# Patient Record
Sex: FEMALE | Race: WHITE | HISPANIC OR LATINO | Employment: PART TIME | URBAN - METROPOLITAN AREA
[De-identification: names, ages, dates, MRNs, and addresses within clinical notes are randomized per-mention and may not be internally consistent; named-entity substitution may affect disease eponyms.]

---

## 2021-03-03 ENCOUNTER — HOSPITAL ENCOUNTER (OUTPATIENT)
Dept: OTHER | Facility: HOSPITAL | Age: 36
Discharge: HOME | End: 2021-03-03

## 2023-08-28 ENCOUNTER — HOSPITAL ENCOUNTER (OUTPATIENT)
Dept: OTHER | Facility: HOSPITAL | Age: 38
Discharge: HOME | End: 2023-08-28

## 2024-07-23 ENCOUNTER — OFFICE VISIT (OUTPATIENT)
Dept: FAMILY MEDICINE CLINIC | Facility: CLINIC | Age: 39
End: 2024-07-23
Payer: OTHER GOVERNMENT

## 2024-07-23 VITALS
WEIGHT: 183.8 LBS | HEIGHT: 67 IN | SYSTOLIC BLOOD PRESSURE: 128 MMHG | DIASTOLIC BLOOD PRESSURE: 72 MMHG | OXYGEN SATURATION: 99 % | BODY MASS INDEX: 28.85 KG/M2 | TEMPERATURE: 98.8 F | HEART RATE: 81 BPM

## 2024-07-23 DIAGNOSIS — Z12.31 ENCOUNTER FOR SCREENING MAMMOGRAM FOR MALIGNANT NEOPLASM OF BREAST: ICD-10-CM

## 2024-07-23 DIAGNOSIS — B35.1 ONYCHOMYCOSIS: ICD-10-CM

## 2024-07-23 DIAGNOSIS — J32.9 FREQUENT EPISODES OF SINUSITIS: ICD-10-CM

## 2024-07-23 DIAGNOSIS — Z86.39 HISTORY OF IRON DEFICIENCY: ICD-10-CM

## 2024-07-23 DIAGNOSIS — Z00.00 ANNUAL PHYSICAL EXAM: ICD-10-CM

## 2024-07-23 DIAGNOSIS — Z86.010 HISTORY OF COLON POLYPS: ICD-10-CM

## 2024-07-23 DIAGNOSIS — D22.30 CHANGE IN FACIAL MOLE: ICD-10-CM

## 2024-07-23 DIAGNOSIS — E55.9 VITAMIN D DEFICIENCY: ICD-10-CM

## 2024-07-23 DIAGNOSIS — Z86.19 HISTORY OF HPV INFECTION: ICD-10-CM

## 2024-07-23 DIAGNOSIS — Z00.00 ENCOUNTER FOR MEDICAL EXAMINATION TO ESTABLISH CARE: Primary | ICD-10-CM

## 2024-07-23 DIAGNOSIS — E66.3 OVERWEIGHT WITH BODY MASS INDEX (BMI) OF 29 TO 29.9 IN ADULT: ICD-10-CM

## 2024-07-23 LAB
ALBUMIN SERPL-MCNC: 4.6 G/DL (ref 3.5–5.2)
ALBUMIN/GLOB SERPL: 1.4 G/DL
ALP SERPL-CCNC: 76 U/L (ref 39–117)
ALT SERPL W P-5'-P-CCNC: 24 U/L (ref 1–33)
ANION GAP SERPL CALCULATED.3IONS-SCNC: 11.3 MMOL/L (ref 5–15)
AST SERPL-CCNC: 23 U/L (ref 1–32)
BASOPHILS # BLD AUTO: 0.04 10*3/MM3 (ref 0–0.2)
BASOPHILS NFR BLD AUTO: 0.7 % (ref 0–1.5)
BILIRUB SERPL-MCNC: 0.2 MG/DL (ref 0–1.2)
BUN SERPL-MCNC: 13 MG/DL (ref 6–20)
BUN/CREAT SERPL: 18.8 (ref 7–25)
CALCIUM SPEC-SCNC: 9.5 MG/DL (ref 8.6–10.5)
CHLORIDE SERPL-SCNC: 101 MMOL/L (ref 98–107)
CHOLEST SERPL-MCNC: 179 MG/DL (ref 0–200)
CO2 SERPL-SCNC: 24.7 MMOL/L (ref 22–29)
CREAT SERPL-MCNC: 0.69 MG/DL (ref 0.57–1)
DEPRECATED RDW RBC AUTO: 37.3 FL (ref 37–54)
EGFRCR SERPLBLD CKD-EPI 2021: 112.7 ML/MIN/1.73
EOSINOPHIL # BLD AUTO: 0.04 10*3/MM3 (ref 0–0.4)
EOSINOPHIL NFR BLD AUTO: 0.7 % (ref 0.3–6.2)
ERYTHROCYTE [DISTWIDTH] IN BLOOD BY AUTOMATED COUNT: 13.1 % (ref 12.3–15.4)
GLOBULIN UR ELPH-MCNC: 3.3 GM/DL
GLUCOSE SERPL-MCNC: 98 MG/DL (ref 65–99)
HCT VFR BLD AUTO: 40.3 % (ref 34–46.6)
HDLC SERPL-MCNC: 61 MG/DL (ref 40–60)
HGB BLD-MCNC: 12.8 G/DL (ref 12–15.9)
IMM GRANULOCYTES # BLD AUTO: 0.02 10*3/MM3 (ref 0–0.05)
IMM GRANULOCYTES NFR BLD AUTO: 0.3 % (ref 0–0.5)
LDLC SERPL CALC-MCNC: 101 MG/DL (ref 0–100)
LDLC/HDLC SERPL: 1.62 {RATIO}
LYMPHOCYTES # BLD AUTO: 2.25 10*3/MM3 (ref 0.7–3.1)
LYMPHOCYTES NFR BLD AUTO: 38.4 % (ref 19.6–45.3)
MCH RBC QN AUTO: 25.4 PG (ref 26.6–33)
MCHC RBC AUTO-ENTMCNC: 31.8 G/DL (ref 31.5–35.7)
MCV RBC AUTO: 80 FL (ref 79–97)
MONOCYTES # BLD AUTO: 0.55 10*3/MM3 (ref 0.1–0.9)
MONOCYTES NFR BLD AUTO: 9.4 % (ref 5–12)
NEUTROPHILS NFR BLD AUTO: 2.96 10*3/MM3 (ref 1.7–7)
NEUTROPHILS NFR BLD AUTO: 50.5 % (ref 42.7–76)
NRBC BLD AUTO-RTO: 0 /100 WBC (ref 0–0.2)
PLATELET # BLD AUTO: 388 10*3/MM3 (ref 140–450)
PMV BLD AUTO: 9.8 FL (ref 6–12)
POTASSIUM SERPL-SCNC: 4.4 MMOL/L (ref 3.5–5.2)
PROT SERPL-MCNC: 7.9 G/DL (ref 6–8.5)
RBC # BLD AUTO: 5.04 10*6/MM3 (ref 3.77–5.28)
SODIUM SERPL-SCNC: 137 MMOL/L (ref 136–145)
TRIGL SERPL-MCNC: 95 MG/DL (ref 0–150)
TSH SERPL DL<=0.05 MIU/L-ACNC: 0.79 UIU/ML (ref 0.27–4.2)
VLDLC SERPL-MCNC: 17 MG/DL (ref 5–40)
WBC NRBC COR # BLD AUTO: 5.86 10*3/MM3 (ref 3.4–10.8)

## 2024-07-23 PROCEDURE — 80061 LIPID PANEL: CPT

## 2024-07-23 PROCEDURE — 80053 COMPREHEN METABOLIC PANEL: CPT

## 2024-07-23 PROCEDURE — 99386 PREV VISIT NEW AGE 40-64: CPT

## 2024-07-23 PROCEDURE — 85025 COMPLETE CBC W/AUTO DIFF WBC: CPT

## 2024-07-23 PROCEDURE — 36415 COLL VENOUS BLD VENIPUNCTURE: CPT

## 2024-07-23 PROCEDURE — 84443 ASSAY THYROID STIM HORMONE: CPT

## 2024-07-23 RX ORDER — IPRATROPIUM BROMIDE 42 UG/1
2 SPRAY, METERED NASAL 4 TIMES DAILY
Qty: 15 ML | Refills: 2 | Status: SHIPPED | OUTPATIENT
Start: 2024-07-23

## 2024-07-23 RX ORDER — CETIRIZINE HYDROCHLORIDE 10 MG/1
10 TABLET ORAL DAILY
COMMUNITY

## 2024-07-23 RX ORDER — CICLOPIROX 80 MG/ML
SOLUTION TOPICAL
COMMUNITY
Start: 2024-06-03

## 2024-07-23 RX ORDER — DIPHENOXYLATE HYDROCHLORIDE AND ATROPINE SULFATE 2.5; .025 MG/1; MG/1
1 TABLET ORAL DAILY
COMMUNITY

## 2024-07-23 RX ORDER — MULTIVIT-MIN/IRON/FOLIC ACID/K 18-600-40
1 CAPSULE ORAL DAILY
COMMUNITY

## 2024-07-23 RX ORDER — CLINDAMYCIN HYDROCHLORIDE 150 MG/1
150 CAPSULE ORAL 4 TIMES DAILY
COMMUNITY
Start: 2024-07-16

## 2024-07-23 NOTE — PROGRESS NOTES
Chief Complaint  Chief Complaint   Patient presents with    [facility]    Annual Exam    Suspicious Skin Lesion     Pt has mole on face that she would like evaluated, left cheek x 3 months       Subjective      Renetta Beverly presents to [facility]  History of Present Illness  The patient presents today to [facility].  She recently moved here from [place].    She has been experiencing sinus issues, which occasionally hinders her ability to drive. Despite wearing glasses and contact lenses, her sinus issues persist. She has been using Flonase and a natural remedy, and has switched to Zyrtec from [facility], which she found beneficial. She has not undergone sinus surgery in the past.  She is interested in seeing an ENT provider to discuss chronic sinusitis.    She has a history of fluctuating iron and vitamin D levels. On several occasions, her vitamin D levels have been low, prompting a temporary regimen of increased dosing of vitamin D, which improved her levels. Currently, she is taking about 7000 IUs of over-the-counter vitamin D daily. She also took iron supplements during her pregnancy and shortly after her pregnancy, but was informed that her iron levels were no longer an issue. However, she experiences dark circles under her eyes even during sleep and wonders if it is related.    She had a colonoscopy in 01/2024, which revealed noncancerous polyps. She was advised to repeat the procedure in 5 years.    She is currently menstruating, with light flow on the first day, heavy flow on the second and third day, and light flow on the fourth day. She is not on any birth control. She has dense breasts and fibrocystic tissue in her breasts, with a couple of large ones on the left side. She has a history of HPV, but on repeat Pap smear HPV head self cleared. She undergoes annual Pap smears, with her last one in 04/2023 in [place].    She has a root canal infection on the  left side and is currently on clindamycin for it.    She has a toenail fungus and has been using nail polish for at least 6 months, which has provided some relief. The middle toe is the most severe.    She had a mole on her face that recently popped, leaving a white discharge from it. It has been present for 3 months. She has other moles that do not look concerning.    Supplemental Information  She has struggled with weight. She has intermittent severe sciatica pain following delivery of her children.   She is not a smoker.   Her sister had a couple of colon polyps. There is no family history of colon cancer. Her mother and grandmother have toenail fungus.      Objective     Medical History:  Past Medical History:   Diagnosis Date    Allergic As a teenager    Manu sinus infections and bouts of hives    Anxiety In my 30s    Colon polyp     My sister had one so i was checked and also had a noncancerous polyp removed    Low back pain     On and off sciatica flare ups after having kids     Past Surgical History:   Procedure Laterality Date     SECTION   &     Both children    COLONOSCOPY      One noncancerous polyp found      Social History     Tobacco Use    Smoking status: Never    Smokeless tobacco: Never   Vaping Use    Vaping status: Never Used   Substance Use Topics    Alcohol use: Not Currently     Comment: I have a drink once every couple of months.    Drug use: Never     Family History   Problem Relation Age of Onset    Anxiety disorder Mother     Cancer Mother         Breast    COPD Mother     Hyperlipidemia Mother     Hyperlipidemia Father     Cancer Paternal Aunt         Breast       Medications:  Prior to Admission medications    Medication Sig Start Date End Date Taking? Authorizing Provider   BLACK COHOSH PO Take 1 capsule by mouth Daily.   Yes Kera Sepulveda MD   cetirizine (zyrTEC) 10 MG tablet Take 1 tablet by mouth Daily.   Yes Kera Sepulveda MD   ciclopirox  "(PENLAC) 8 % solution  6/3/24  Yes Kera Speulveda MD   clindamycin (CLEOCIN) 150 MG capsule Take 1 capsule by mouth 4 (Four) Times a Day. 7/16/24  Yes Kera Sepulveda MD   multivitamin (THERAGRAN) tablet tablet Take 1 tablet by mouth Daily.   Yes Kera Sepulveda MD   Probiotic Product (PROBIOTIC DAILY PO) Take 1 capsule by mouth Daily.   Yes Kera Sepulveda MD   Vitamin D, Cholecalciferol, 50 MCG (2000 UT) capsule Take 1 capsule by mouth Daily.   Yes Kera Sepulveda MD   vitamin D3 125 MCG (5000 UT) capsule capsule Take 1 capsule by mouth Daily.   Yes Kera Sepulveda MD        Allergies:   Latex, Penicillins, and Shellfish-derived products    Health Maintenance Due   Topic Date Due    COVID-19 Vaccine (1 - 2023-24 season) Never done    HEPATITIS C SCREENING  Never done    ANNUAL PHYSICAL  Never done    PAP SMEAR  Never done         Vital Signs:   /72 (BP Location: Left arm, Patient Position: Sitting, Cuff Size: Adult)   Pulse 81   Temp 98.8 °F (37.1 °C) (Oral)   Ht 168.9 cm (66.5\")   Wt 83.4 kg (183 lb 12.8 oz)   SpO2 99%   BMI 29.22 kg/m²     Wt Readings from Last 3 Encounters:   07/23/24 83.4 kg (183 lb 12.8 oz)     BP Readings from Last 3 Encounters:   07/23/24 128/72     Physical Exam  Vitals reviewed.   Constitutional:       Appearance: Normal appearance. She is well-developed.   HENT:      Head: Normocephalic and atraumatic.      Right Ear: No tenderness. A middle ear effusion is present.      Left Ear: No tenderness. A middle ear effusion is present.      Nose: Congestion present.      Right Sinus: Maxillary sinus tenderness present.      Left Sinus: Maxillary sinus tenderness present.   Eyes:      Conjunctiva/sclera: Conjunctivae normal.      Pupils: Pupils are equal, round, and reactive to light.   Cardiovascular:      Rate and Rhythm: Normal rate and regular rhythm.      Heart sounds: No murmur heard.     No friction rub. No gallop.   Pulmonary:      Effort: " Pulmonary effort is normal.      Breath sounds: Normal breath sounds. No wheezing or rhonchi.   Abdominal:      General: Bowel sounds are normal. There is no distension.      Palpations: Abdomen is soft.      Tenderness: There is no abdominal tenderness.   Skin:     General: Skin is warm and dry.   Neurological:      Mental Status: She is alert and oriented to person, place, and time.      Cranial Nerves: No cranial nerve deficit.   Psychiatric:         Mood and Affect: Mood and affect normal.         Behavior: Behavior normal.         Thought Content: Thought content normal.         Judgment: Judgment normal.       Physical Exam        Result Review :    The following data was reviewed by CAT Sewell on 07/23/24 at 13:02 EDT:        No Images in the past 120 days found..    Results                 Assessment and Plan    Diagnoses and all orders for this visit:    1. Encounter for medical examination to establish care (Primary)    2. Annual physical exam  -     CBC & Differential  -     Comprehensive Metabolic Panel  -     Lipid Panel  -     TSH Rfx On Abnormal To Free T4    3. History of HPV infection    4. History of iron deficiency    5. Vitamin D deficiency    6. Change in facial mole    7. Overweight with body mass index (BMI) of 29 to 29.9 in adult    8. History of colon polyps    9. Encounter for screening mammogram for malignant neoplasm of breast  -     Cancel: Mammo Screening Digital Tomosynthesis Bilateral With CAD; Future  -     Cancel: Mammo Screening Digital Tomosynthesis Bilateral With CAD; Future  -     Mammo Screening Digital Tomosynthesis Bilateral With CAD; Future    10. Frequent episodes of sinusitis  -     Ambulatory Referral to ENT (Otolaryngology)  -     ipratropium (ATROVENT) 0.06 % nasal spray; 2 sprays into the nostril(s) as directed by provider 4 (Four) Times a Day.  Dispense: 15 mL; Refill: 2    11. Onychomycosis       Assessment & Plan  1. Health maintenance.  She is due  for a mammogram next month. Blood work will be conducted to rule out anemia, evaluate iron levels and vitamin D levels. A mammogram will be ordered.  Patient will follow-up for repeat Pap next April.    2.  Chronic sinusitis.  [facility] to [facility] will be made. Atrovent nasal spray will be prescribed.  Continue use of Flonase, Zyrtec.    3. Noncancerous colon polyps.  A colonoscopy is recommended in 5 years.    4. Menorrhagia.  This could be contributing to her low iron levels.    5. Onychomycosis.  Oral medications were discussed but patient declines at this time as these medications can be hard on the liver.  She will continue topical antifungal treatment.    6. Facial mole.  She was advised to clean the mole with a gentle facial  and keep it moisturized. The mole will be monitored for a week. Should the mole not heal within a couple of weeks, a referral to Dermatology will be made.          Smoking Cessation:    [facility]  reports that she has never smoked. She has never used smokeless tobacco.       Follow Up   Return in about 6 months (around 1/23/2025) for Next scheduled follow up.  Patient was given instructions and counseling regarding her condition or for health maintenance advice. Please see specific information pulled into the AVS if appropriate.     Please note that portions of this note were completed with a voice recognition program.    Patient or patient representative verbalized consent for the use of Ambient Listening during the visit with  CAT Sewell for chart documentation. 7/23/2024  12:59 EDT

## 2024-08-01 ENCOUNTER — PATIENT ROUNDING (BHMG ONLY) (OUTPATIENT)
Dept: FAMILY MEDICINE CLINIC | Facility: CLINIC | Age: 39
End: 2024-08-01
Payer: OTHER GOVERNMENT

## 2024-08-01 NOTE — PROGRESS NOTES
August 1, 2024    Hello, may I speak with Renetta Beverly?    My name is Vish Jimenez       I am  with [facility]  [address]  [address]  [facility] [address]  [phone].    Before we get started may I verify your date of birth? 1985    I am calling to officially welcome you to our practice and ask about your recent visit. Is this a good time to talk? yes    Tell me about your visit with us. What things went well?  it was a great exp, everyone was so nice Jackie listen to my need and the staff was wonderful       We're always looking for ways to make our patients' experiences even better. Do you have recommendations on ways we may improve?  no    Overall were you satisfied with your first visit to our practice? yes       I appreciate you taking the time to speak with me today. Is there anything else I can do for you? no      Thank you, and have a great day.

## 2024-09-20 ENCOUNTER — HOSPITAL ENCOUNTER (OUTPATIENT)
Dept: MAMMOGRAPHY | Facility: HOSPITAL | Age: 39
Discharge: HOME | End: 2024-09-20
Payer: OTHER GOVERNMENT

## 2024-09-20 DIAGNOSIS — Z12.31 ENCOUNTER FOR SCREENING MAMMOGRAM FOR MALIGNANT NEOPLASM OF BREAST: ICD-10-CM

## 2024-09-20 PROCEDURE — 77067 SCR MAMMO BI INCL CAD: CPT

## 2024-09-20 PROCEDURE — 77063 BREAST TOMOSYNTHESIS BI: CPT

## 2024-10-01 DIAGNOSIS — Z12.31 ENCOUNTER FOR SCREENING MAMMOGRAM FOR MALIGNANT NEOPLASM OF BREAST: Primary | ICD-10-CM

## 2024-10-17 ENCOUNTER — TELEPHONE (OUTPATIENT)
Dept: FAMILY MEDICINE CLINIC | Facility: CLINIC | Age: 39
End: 2024-10-17

## 2024-10-17 ENCOUNTER — CLINICAL SUPPORT (OUTPATIENT)
Dept: FAMILY MEDICINE CLINIC | Facility: CLINIC | Age: 39
End: 2024-10-17
Payer: OTHER GOVERNMENT

## 2024-10-17 DIAGNOSIS — Z23 NEED FOR INFLUENZA VACCINATION: Primary | ICD-10-CM

## 2024-10-17 DIAGNOSIS — Z12.31 ENCOUNTER FOR SCREENING MAMMOGRAM FOR MALIGNANT NEOPLASM OF BREAST: Primary | ICD-10-CM

## 2024-10-17 PROCEDURE — 90471 IMMUNIZATION ADMIN: CPT

## 2024-10-17 PROCEDURE — 90656 IIV3 VACC NO PRSV 0.5 ML IM: CPT

## 2024-10-17 NOTE — TELEPHONE ENCOUNTER
I looked in patient's chart, she completed a mammogram on 09/20/2024 and was unsure why another order was placed for one. Please advise.

## 2024-10-17 NOTE — TELEPHONE ENCOUNTER
Caller: Renetta Beverly    Relationship: Self    Best call back number: [phone]     What is the best time to reach you: ANYTIME     Who are you requesting to speak with (clinical staff, provider,  specific staff member): CLINICAL      What was the call regarding: PATIENT IS CALLING TO CLARIFY A RECENT ORDER, PLACED THAT THE PATIENT WAS ABLE TO VIEW VIA MY CHART, STATED SHE HAS ALREADY COMPLETED MAMMOGRAM, POSSIBLE DUPLICATE ORDER.

## 2024-12-20 ENCOUNTER — OFFICE VISIT (OUTPATIENT)
Dept: FAMILY MEDICINE CLINIC | Facility: CLINIC | Age: 39
End: 2024-12-20
Payer: OTHER GOVERNMENT

## 2024-12-20 VITALS
BODY MASS INDEX: 29.98 KG/M2 | TEMPERATURE: 98.4 F | OXYGEN SATURATION: 100 % | HEART RATE: 95 BPM | SYSTOLIC BLOOD PRESSURE: 114 MMHG | DIASTOLIC BLOOD PRESSURE: 78 MMHG | WEIGHT: 191 LBS | HEIGHT: 67 IN

## 2024-12-20 DIAGNOSIS — R09.81 SINUS CONGESTION: ICD-10-CM

## 2024-12-20 DIAGNOSIS — J34.2 DEVIATED SEPTUM: ICD-10-CM

## 2024-12-20 DIAGNOSIS — J32.4 CHRONIC PANSINUSITIS: Primary | ICD-10-CM

## 2024-12-20 DIAGNOSIS — J32.4 CHRONIC PANSINUSITIS: ICD-10-CM

## 2024-12-20 PROCEDURE — 99214 OFFICE O/P EST MOD 30 MIN: CPT

## 2024-12-20 RX ORDER — OXYMETAZOLINE HYDROCHLORIDE 0.05 G/100ML
2 SPRAY NASAL 2 TIMES DAILY
Qty: 15 ML | Refills: 0 | Status: SHIPPED | OUTPATIENT
Start: 2024-12-20

## 2024-12-20 RX ORDER — PREDNISONE 20 MG/1
TABLET ORAL
COMMUNITY
Start: 2024-11-13

## 2024-12-20 RX ORDER — GUAIFENESIN 600 MG/1
1200 TABLET, EXTENDED RELEASE ORAL 2 TIMES DAILY
Qty: 40 TABLET | Refills: 0 | Status: SHIPPED | OUTPATIENT
Start: 2024-12-20 | End: 2024-12-30

## 2024-12-20 RX ORDER — AZITHROMYCIN 250 MG/1
TABLET, FILM COATED ORAL
Qty: 6 TABLET | Refills: 0 | Status: SHIPPED | OUTPATIENT
Start: 2024-12-20

## 2024-12-20 RX ORDER — ALBUTEROL SULFATE 90 UG/1
INHALANT RESPIRATORY (INHALATION)
COMMUNITY
Start: 2024-11-13

## 2024-12-20 RX ORDER — AZITHROMYCIN 250 MG/1
TABLET, FILM COATED ORAL
Qty: 6 TABLET | Refills: 0 | Status: SHIPPED | OUTPATIENT
Start: 2024-12-20 | End: 2024-12-20 | Stop reason: SDUPTHER

## 2024-12-20 NOTE — PROGRESS NOTES
Chief Complaint  Chief Complaint   Patient presents with    Vitamin D Deficiency    Sinus Problem     Pressure in face and ears. She has been suing saline and sudafed for 7 days. Pt has deviated septum.       Subjective      Renetta Beverly presents to [facility]  History of Present Illness  The patient is a 39-year-old female who presents today for a 6-month follow-up.    She has a history of chronic sinusitis and was previously referred [facility]. She was advised to continue using Zyrtec and Flonase. Today, she reports symptoms of a possible sinus infection, including facial and ear pressure. She has been using Sudafed for the past 7 days and nasal saline rinses. She occasionally uses Flonase and has been using saline rinses. Despite using saline spray and taking warm showers in the morning, she has not seen any improvement. She reports no fever. Her last sinus infection was at the start of the year. She uses a natural nose gel and breathing strips. She avoids antibiotic treatment but was treated with azithromycin earlier in the year. She reports pressure under her eyes, ears, and behind her eyes. She has not been using her contact lenses due to pressure on top of her eyes, especially when driving at night.    She has been diagnosed with a deviated septum by an ENT specialist and is scheduled for allergy testing. She experienced dryness in her nose for 2 weeks, which led to blood discharge.  After working remotely, she experiences pressure in her heart and head, accompanied by dizziness. She consumes a significant amount of coffee and has been experiencing increased leg cramps.    She had to have antibiotics for an abscess in her tooth in 06/2023.    FAMILY HISTORY  She has a family history of heart issues.    ALLERGIES  She is allergic to PENICILLIN.      Objective     Medical History:  Past Medical History:   Diagnosis Date    Allergic As a teenager    Manu sinus  infections and bouts of hives    Anxiety In my 30s    Colon polyp     My sister had one so i was checked and also had a noncancerous polyp removed    Low back pain 2019    On and off sciatica flare ups after having kids     Past Surgical History:   Procedure Laterality Date     SECTION   &     Both children    COLONOSCOPY      One noncancerous polyp found      Social History     Tobacco Use    Smoking status: Never    Smokeless tobacco: Never   Vaping Use    Vaping status: Never Used   Substance Use Topics    Alcohol use: Not Currently     Comment: I have a drink once every couple of months.    Drug use: Never     Family History   Problem Relation Age of Onset    Anxiety disorder Mother     Cancer Mother         Breast    COPD Mother     Hyperlipidemia Mother     Hyperlipidemia Father     Cancer Paternal Aunt         Breast       Medications:  Prior to Admission medications    Medication Sig Start Date End Date Taking? Authorizing Provider   albuterol sulfate  (90 Base) MCG/ACT inhaler 2 PUFFS EVERY 4 HOURS AS NEEDED FOR COUGH, WHEEZE OR SHORTNESS OF BREATH. USE WITH VORTEX SPACER. 24  Yes Kera Sepulveda MD   BLACK COHOSH PO Take 1 capsule by mouth Daily.   Yes Kera Sepulveda MD   cetirizine (zyrTEC) 10 MG tablet Take 1 tablet by mouth Daily.   Yes Kera Sepulveda MD   ciclopirox (PENLAC) 8 % solution  6/3/24  Yes Kera Sepulveda MD   ipratropium (ATROVENT) 0.06 % nasal spray 2 sprays into the nostril(s) as directed by provider 4 (Four) Times a Day. 24  Yes Jackie Mckeon APRN   multivitamin (THERAGRAN) tablet tablet Take 1 tablet by mouth Daily.   Yes Kera Sepulveda MD   predniSONE (DELTASONE) 20 MG tablet TAKE 1 TABLET TWICE DAILY FOR 5 DAYS.- START 5 DAYS BEFORE TESTING 24  Yes Kera Sepulveda MD   Probiotic Product (PROBIOTIC DAILY PO) Take 1 capsule by mouth Daily.   Yes Kera Sepulveda MD   Vitamin D,  "Cholecalciferol, 50 MCG (2000 UT) capsule Take 1 capsule by mouth Daily.   Yes Kera Sepulveda MD   vitamin D3 125 MCG (5000 UT) capsule capsule Take 1 capsule by mouth Daily.   Yes Kera Sepulveda MD   clindamycin (CLEOCIN) 150 MG capsule Take 1 capsule by mouth 4 (Four) Times a Day. 7/16/24   Kera Sepulveda MD        Allergies:   Latex, Penicillins, and Shellfish-derived products    Health Maintenance Due   Topic Date Due    HEPATITIS C SCREENING  Never done    PAP SMEAR  Never done    COVID-19 Vaccine (1 - 2024-25 season) Never done         Vital Signs:   /78 (BP Location: Left arm, Patient Position: Sitting, Cuff Size: Adult)   Pulse 95   Temp 98.4 °F (36.9 °C) (Oral)   Ht 168.9 cm (66.5\")   Wt 86.6 kg (191 lb)   SpO2 100%   BMI 30.37 kg/m²     Wt Readings from Last 3 Encounters:   12/20/24 86.6 kg (191 lb)   07/23/24 83.4 kg (183 lb 12.8 oz)     BP Readings from Last 3 Encounters:   12/20/24 114/78   07/23/24 128/72                Physical Exam  Vitals reviewed.   Constitutional:       Appearance: Normal appearance. She is well-developed.   HENT:      Head: Normocephalic and atraumatic.      Right Ear: A middle ear effusion is present. Tympanic membrane is not erythematous.      Left Ear: No tenderness.  No middle ear effusion. Tympanic membrane is not erythematous.      Nose:      Right Sinus: Maxillary sinus tenderness and frontal sinus tenderness present.      Left Sinus: Maxillary sinus tenderness and frontal sinus tenderness present.   Eyes:      Conjunctiva/sclera: Conjunctivae normal.      Pupils: Pupils are equal, round, and reactive to light.   Cardiovascular:      Rate and Rhythm: Normal rate and regular rhythm.      Heart sounds: No murmur heard.     No friction rub. No gallop.   Pulmonary:      Effort: Pulmonary effort is normal.      Breath sounds: Normal breath sounds. No wheezing or rhonchi.   Abdominal:      General: Bowel sounds are normal. There is no distension. "      Palpations: Abdomen is soft.      Tenderness: There is no abdominal tenderness.   Skin:     General: Skin is warm and dry.   Neurological:      Mental Status: She is alert and oriented to person, place, and time.      Cranial Nerves: No cranial nerve deficit.   Psychiatric:         Mood and Affect: Mood and affect normal.         Behavior: Behavior normal.         Thought Content: Thought content normal.         Judgment: Judgment normal.       Physical Exam  Fluid is present in the right ear than the left ear.  Lungs are normal.  Heart is normal.      Result Review :    The following data was reviewed by CAT Sewell on 12/20/24 at 08:14 EST:        Mammo Screening Digital Tomosynthesis Bilateral With CAD    Result Date: 9/29/2024  No mammographic signs of malignancy. Recommend annual screening mammography  TISSUE DENSITY: The breasts are heterogeneously dense, which may obscure small masses. The patient will receive a letter regarding breast density.  BI-RADS ASSESSMENT: BI-RADS 1. Negative.   Note: It has been reported that there is approximately a 15% false negative in mammography. Therefore, management of a palpable abnormality should not be deferred because of a negative mammogram.           Electronically Signed By-BETSY GEORGE MD On:9/29/2024 10:59 AM        Results                 Assessment and Plan    Diagnoses and all orders for this visit:    1. Chronic pansinusitis (Primary)  -     guaiFENesin (Mucinex) 600 MG 12 hr tablet; Take 2 tablets by mouth 2 (Two) Times a Day for 10 days.  Dispense: 40 tablet; Refill: 0  -     oxymetazoline (Afrin Nasal Spray) 0.05 % nasal spray; Administer 2 sprays into the nostril(s) as directed by provider 2 (Two) Times a Day.  Dispense: 15 mL; Refill: 0  -     azithromycin (Zithromax Z-Ezio) 250 MG tablet; Take 2 tablets by mouth on day 1, then 1 tablet daily on days 2-5  Dispense: 6 tablet; Refill: 0    2. Deviated septum  -     guaiFENesin (Mucinex)  600 MG 12 hr tablet; Take 2 tablets by mouth 2 (Two) Times a Day for 10 days.  Dispense: 40 tablet; Refill: 0  -     oxymetazoline (Afrin Nasal Spray) 0.05 % nasal spray; Administer 2 sprays into the nostril(s) as directed by provider 2 (Two) Times a Day.  Dispense: 15 mL; Refill: 0    3. Sinus congestion  -     guaiFENesin (Mucinex) 600 MG 12 hr tablet; Take 2 tablets by mouth 2 (Two) Times a Day for 10 days.  Dispense: 40 tablet; Refill: 0  -     oxymetazoline (Afrin Nasal Spray) 0.05 % nasal spray; Administer 2 sprays into the nostril(s) as directed by provider 2 (Two) Times a Day.  Dispense: 15 mL; Refill: 0  -     azithromycin (Zithromax Z-Ezio) 250 MG tablet; Take 2 tablets by mouth on day 1, then 1 tablet daily on days 2-5  Dispense: 6 tablet; Refill: 0       Assessment & Plan  1. Sinus infection.  Symptoms are indicative of a sinus infection, with significant pressure in the face and ears, and thick mucus. She was advised to discontinue Sudafed. Continuation of steam rinse, saline rinse, and neti pot was advised. Afrin was suggested for 5 to 7 days. Mucinex was prescribed to thin secretions. Azithromycin was prescribed and will be sent to [facility] pharmacy.    2. Dizziness.  The dizziness could be due to sinus pressure or perimenopause. She was advised to replace coffee with water and increase her intake of electrolytes, such as Liquid IV. If dizziness persists, a Holter monitor may be considered to capture any cardiac events.            Smoking Cessation:    Renetta Beverly  reports that she has never smoked. She has never used smokeless tobacco.             Follow Up   Return in about 6 months (around 6/20/2025), or if symptoms worsen or fail to improve, for Next scheduled follow up, Annual physical.  Patient was given instructions and counseling regarding her condition or for health maintenance advice. Please see specific information pulled into the AVS if appropriate.     Please note that portions of  this note were completed with a voice recognition program.    Patient or patient representative verbalized consent for the use of Ambient Listening during the visit with  CAT Sewell for chart documentation. 12/20/2024  08:14 EST

## 2025-04-23 ENCOUNTER — OFFICE VISIT (OUTPATIENT)
Dept: FAMILY MEDICINE CLINIC | Facility: CLINIC | Age: 40
End: 2025-04-23
Payer: OTHER GOVERNMENT

## 2025-04-23 VITALS
SYSTOLIC BLOOD PRESSURE: 104 MMHG | HEIGHT: 67 IN | OXYGEN SATURATION: 98 % | WEIGHT: 196.1 LBS | DIASTOLIC BLOOD PRESSURE: 70 MMHG | TEMPERATURE: 98.3 F | BODY MASS INDEX: 30.78 KG/M2 | HEART RATE: 105 BPM

## 2025-04-23 DIAGNOSIS — B35.1 ONYCHOMYCOSIS: ICD-10-CM

## 2025-04-23 DIAGNOSIS — M54.42 CHRONIC MIDLINE LOW BACK PAIN WITH LEFT-SIDED SCIATICA: ICD-10-CM

## 2025-04-23 DIAGNOSIS — N89.8 VAGINAL DISCHARGE: ICD-10-CM

## 2025-04-23 DIAGNOSIS — G89.29 CHRONIC RLQ PAIN: ICD-10-CM

## 2025-04-23 DIAGNOSIS — G89.29 CHRONIC MIDLINE LOW BACK PAIN WITH LEFT-SIDED SCIATICA: ICD-10-CM

## 2025-04-23 DIAGNOSIS — Z01.419 WELL WOMAN EXAM WITH ROUTINE GYNECOLOGICAL EXAM: Primary | ICD-10-CM

## 2025-04-23 DIAGNOSIS — R10.31 CHRONIC RLQ PAIN: ICD-10-CM

## 2025-04-23 LAB
CANDIDA SPECIES: NEGATIVE
GARDNERELLA VAGINALIS: NEGATIVE
T VAGINALIS DNA VAG QL PROBE+SIG AMP: NEGATIVE

## 2025-04-23 PROCEDURE — G0123 SCREEN CERV/VAG THIN LAYER: HCPCS

## 2025-04-23 PROCEDURE — 87480 CANDIDA DNA DIR PROBE: CPT

## 2025-04-23 PROCEDURE — 87660 TRICHOMONAS VAGIN DIR PROBE: CPT

## 2025-04-23 PROCEDURE — 87510 GARDNER VAG DNA DIR PROBE: CPT

## 2025-04-23 PROCEDURE — 87624 HPV HI-RISK TYP POOLED RSLT: CPT

## 2025-04-23 RX ORDER — CICLOPIROX 80 MG/ML
SOLUTION TOPICAL NIGHTLY
Qty: 6 ML | Refills: 0 | Status: SHIPPED | OUTPATIENT
Start: 2025-04-23

## 2025-04-23 NOTE — PROGRESS NOTES
"  ENCOUNTER DATE:  04/23/2025    Renetta Zayas-Pennington / 39 y.o. / female      CHIEF COMPLAINT     Gynecologic Exam      VITALS     Visit Vitals  /70 (BP Location: Left arm, Patient Position: Sitting, Cuff Size: Adult)   Pulse 105   Temp 98.3 °F (36.8 °C) (Oral)   Ht 168.9 cm (66.5\")   Wt 89 kg (196 lb 1.6 oz)   LMP 04/04/2025 (Exact Date)   SpO2 98%   Breastfeeding No   BMI 31.18 kg/m²       BP Readings from Last 3 Encounters:   04/23/25 104/70   12/20/24 114/78   07/23/24 128/72     Wt Readings from Last 3 Encounters:   04/23/25 89 kg (196 lb 1.6 oz)   12/20/24 86.6 kg (191 lb)   07/23/24 83.4 kg (183 lb 12.8 oz)      Body mass index is 31.18 kg/m².    MEDICATIONS     Current Outpatient Medications on File Prior to Visit   Medication Sig Dispense Refill    albuterol sulfate  (90 Base) MCG/ACT inhaler 2 PUFFS EVERY 4 HOURS AS NEEDED FOR COUGH, WHEEZE OR SHORTNESS OF BREATH. USE WITH VORTEX SPACER.      BLACK COHOSH PO Take 1 capsule by mouth Daily.      cetirizine (zyrTEC) 10 MG tablet Take 1 tablet by mouth Daily.      ipratropium (ATROVENT) 0.06 % nasal spray 2 sprays into the nostril(s) as directed by provider 4 (Four) Times a Day. 15 mL 2    multivitamin (THERAGRAN) tablet tablet Take 1 tablet by mouth Daily.      oxymetazoline (Afrin Nasal Spray) 0.05 % nasal spray Administer 2 sprays into the nostril(s) as directed by provider 2 (Two) Times a Day. 15 mL 0    Probiotic Product (PROBIOTIC DAILY PO) Take 1 capsule by mouth Daily.      Vitamin D, Cholecalciferol, 50 MCG (2000 UT) capsule Take 1 capsule by mouth Daily.      vitamin D3 125 MCG (5000 UT) capsule capsule Take 1 capsule by mouth Daily.      [DISCONTINUED] ciclopirox (PENLAC) 8 % solution       predniSONE (DELTASONE) 20 MG tablet TAKE 1 TABLET TWICE DAILY FOR 5 DAYS.- START 5 DAYS BEFORE TESTING      [DISCONTINUED] azithromycin (Zithromax Z-Ezio) 250 MG tablet Take 2 tablets by mouth on day 1, then 1 tablet daily on days 2-5 6 tablet 0 " "    No current facility-administered medications on file prior to visit.         HISTORY OF PRESENT ILLNESS     Renetta presents for annual health maintenance visit.    Obstetric History:  OB History    No obstetric history on file.        Menstrual History:     Patient's last menstrual period was 2025 (exact date).         No results found for: \"HPVAPTIMA\"    History of Present Illness  The patient presents for a well-woman exam with Pap smear, onychomycosis, and sciatica.    She has a history of an abnormal Pap smear a few years ago, followed by a normal one. Her last Pap smear was conducted in [place]. No current concerns or issues are reported. Intermittent pain, described as sharp, is attributed to scar tissue from a  performed many years ago. This pain is occasionally associated with her menstrual cycle. Initially, the pain was triggered by bowel movements post-, but this symptom has since resolved. She does not experience pain during intercourse or any vaginal discharge or itching. Occasional brown discharge following her menstrual period is noted. No concerns regarding sexually transmitted diseases are reported, and she does not wish to undergo any screenings or testing today. Her last menstrual period started on 2025, and her cycles remain regular. A mammogram was conducted in 2024, and no new lumps or bumps in her breasts are reported, noting that they are consistent with her known fibrocystic changes.    A refill of Penlac solution for her toenails is requested, which she admits to not having cared for as she should have. Improvement with the use of Penlac is observed, and she prefers not to take oral antifungal medication.    Plans to seek physical therapy for sciatica are mentioned, which has been present since before the birth of her daughter, who is now five years old. Radiating pain down the left side after two days of exercise is experienced, leading to periods of " bed rest. No x-ray of her back has been done in [place]. She was previously referred to a physical therapist in [place], where exercises and steroid injections in her back were administered.    GYNECOLOGICAL HISTORY:  - Last Menstrual Period: 2025    PAST SURGICAL HISTORY:  - : Date   - Steroid injections for back pain: Date     FAMILY HISTORY  Her mother and grandmother had the same fungal disease of the toenails.        Last health maintenance visit: approximately 1 year ago  General health: good  Lifestyle:  Attempting to lose weight?: Yes   Diet: eats moderately healthy  Exercise: generally stays active (work/exercise)  Tobacco: Never used   Alcohol: does not drink  Work: Full-time  Reproductive health:  Sexually active?: Yes   Sexual problems?: No problems  Concern for STD?: No    Sees Gynecologist?: No   Flores/Postmenopausal?: No   Domestic abuse concerns: No   - Depression Screenin/23/2025     8:23 AM   PHQ-2/PHQ-9 Depression Screening   Little interest or pleasure in doing things Not at all   Feeling down, depressed, or hopeless Not at all   How difficult have these problems made it for you to do your work, take care of things at home, or get along with other people? Not difficult at all         PHQ-2: 0 (Not depressed)   PHQ-9: 0 (Negative screening for depression)    Patient Care Team:  Jackie Mckeon APRN as PCP - General (Emergency Medicine)      ALLERGIES  Allergies   Allergen Reactions    Latex Hives    Penicillins Hives    Shellfish-Derived Products Hives        PFSH:     The following portions of the patient's history were reviewed and updated as appropriate: Allergies / Current Medications / Past Medical History / Surgical History / Social History / Family History    PROBLEM LIST   There is no problem list on file for this patient.      PAST MEDICAL HISTORY  Past Medical History:   Diagnosis Date    Allergic As a teenager    Manu sinus infections and bouts  of hives    Anxiety In my 30s    Colon polyp     My sister had one so i was checked and also had a noncancerous polyp removed    Low back pain     On and off sciatica flare ups after having kids       SURGICAL HISTORY  Past Surgical History:   Procedure Laterality Date     SECTION   &     Both children    COLONOSCOPY      One noncancerous polyp found       SOCIAL HISTORY  Social History     Socioeconomic History    Marital status:    Tobacco Use    Smoking status: Never    Smokeless tobacco: Never   Vaping Use    Vaping status: Never Used   Substance and Sexual Activity    Alcohol use: Not Currently     Comment: I have a drink once every couple of months.    Drug use: Never    Sexual activity: Yes     Partners: Male     Birth control/protection: Condom       FAMILY HISTORY  Family History   Problem Relation Age of Onset    Anxiety disorder Mother     Cancer Mother         Breast    COPD Mother     Hyperlipidemia Mother     Hyperlipidemia Father     Cancer Paternal Aunt         Breast       IMMUNIZATION HISTORY  Immunization History   Administered Date(s) Administered    Flublok 18+yrs 2021    Fluzone  >6mos 10/17/2024    Fluzone (or Fluarix & Flulaval for VFC) >6mos 2018, 2018, 10/04/2019, 2020, 2023    Tdap 2018, 10/04/2019    Varicella 2022         PHYSICAL EXAMINATION     Physical Exam  Vitals reviewed. Exam conducted with a chaperone present.   Constitutional:       General: She is not in acute distress.     Appearance: Normal appearance. She is well-developed. She is not diaphoretic.   HENT:      Head: Normocephalic and atraumatic. Hair is normal.      Right Ear: Hearing normal. No decreased hearing noted. No drainage.      Left Ear: Hearing and tympanic membrane normal. No decreased hearing noted.      Nose: Nose normal. No nasal deformity.      Mouth/Throat:      Mouth: Mucous membranes are moist.   Eyes:      General: Lids are  normal.      Extraocular Movements: Extraocular movements intact.      Conjunctiva/sclera: Conjunctivae normal.      Pupils: Pupils are equal, round, and reactive to light.   Neck:      Thyroid: No thyromegaly.      Vascular: No JVD.   Cardiovascular:      Rate and Rhythm: Normal rate and regular rhythm.      Pulses: Normal pulses.      Heart sounds: Normal heart sounds. No murmur heard.     No friction rub. No gallop.   Pulmonary:      Effort: Pulmonary effort is normal. No respiratory distress.      Breath sounds: Normal breath sounds. No wheezing.   Chest:   Breasts:     Breasts are symmetrical.      Right: Normal. No mass, nipple discharge, skin change or tenderness.      Left: Normal. No mass, nipple discharge, skin change or tenderness.   Abdominal:      General: Bowel sounds are normal. There is no distension.      Palpations: Abdomen is soft.      Tenderness: There is no abdominal tenderness in the right lower quadrant.   Genitourinary:     General: Normal vulva.      Pubic Area: No rash.       Labia:         Right: No rash or lesion.         Left: No rash or lesion.       Urethra: No urethral swelling.      Vagina: Normal. Vaginal discharge present. No lesions.      Cervix: Erythema present. No discharge or cervical bleeding.      Uterus: Normal. Not tender.       Adnexa: Right adnexa normal and left adnexa normal.        Right: No tenderness.          Left: No tenderness.        Rectum: Normal.            Comments: Erythematous area to right lower portion of the cervix; no bleeding noted  Musculoskeletal:         General: No tenderness or deformity. Normal range of motion.      Cervical back: Normal range of motion and neck supple.   Skin:     General: Skin is warm and dry.      Findings: No erythema or rash.   Neurological:      Mental Status: She is alert and oriented to person, place, and time.      Cranial Nerves: No cranial nerve deficit.      Motor: No abnormal muscle tone.      Coordination:  "Coordination normal.      Deep Tendon Reflexes: Reflexes are normal and symmetric.   Psychiatric:         Mood and Affect: Mood normal.         Behavior: Behavior normal.         Thought Content: Thought content normal.         Judgment: Judgment normal.       Physical Exam  Neck: Supple, no abnormalities  Respiratory: Clear to auscultation, no wheezing, rales or rhonchi  Cardiovascular: Regular rate and rhythm, no murmurs, rubs, or gallops  Breast: Normal appearance, no masses or tenderness, no nipple retraction, dimpling, or discharge  Pelvic: No lesions, no cervical motion tenderness, no adnexal masses, no cervical masses, normal cervical os. Cervix was a little bit red on the outer part with a little bit of discharge.      REVIEWED DATA      Labs:    Lab Results   Component Value Date     07/23/2024    K 4.4 07/23/2024    CALCIUM 9.5 07/23/2024    AST 23 07/23/2024    ALT 24 07/23/2024    BUN 13 07/23/2024    CREATININE 0.69 07/23/2024    CREATININE 0.65 09/13/2022    CREATININE 0.62 09/25/2020       Lab Results   Component Value Date    GLU 87 09/13/2022    HGBA1C 5.5 09/13/2022    HGBA1C 5.8 09/25/2020    TSH 0.794 07/23/2024       Lab Results   Component Value Date     (H) 07/23/2024    HDL 61 (H) 07/23/2024    TRIG 95 07/23/2024    CHOLHDLRATIO 2.7 09/13/2022       No results found for: \"BIJE00IH\"     Lab Results   Component Value Date    WBC 5.86 07/23/2024    HGB 12.8 07/23/2024    MCV 80.0 07/23/2024     07/23/2024       No results found for: \"PROTEIN\", \"GLUCOSEU\", \"BLOODU\", \"NITRITEU\", \"LEUKOCYTESUR\"     No results found for: \"HEPCVIRUSABY\"    Results          ASSESSMENT & PLAN     ANNUAL WELLNESS EXAM / PHYSICAL  Diagnoses and all orders for this visit:    1. Well woman exam with routine gynecological exam (Primary)  -     IgP, Aptima HPV    2. Chronic RLQ pain    3. Onychomycosis  -     ciclopirox (PENLAC) 8 % solution; Apply  topically to the appropriate area as directed Every " Night.  Dispense: 6 mL; Refill: 0    4. Chronic midline low back pain with left-sided sciatica  -     XR Spine Lumbar 4+ View; Future  -     Ambulatory Referral to Physical Therapy for Evaluation & Treatment    5. Vaginal discharge  -     Gardnerella vaginalis, Trichomonas vaginalis, Candida albicans, DNA - Swab, Vagina      .  Assessment & Plan  1. Well-woman exam with Pap smear.  - Intermittent pain potentially due to scar tissue from previous , possibly affecting the ovary.  - Brown discharge post-menstruation is normal; cervix appeared slightly red with some discharge.  - Swab taken to test for bacterial vaginosis, yeast, or trichomonas; Pap smear performed to check for atypical cells.  - Repeat mammogram scheduled for this year; if pain worsens, an ultrasound may be considered to evaluate ovary or fallopian tube.    2. Onychomycosis.  - Toenail fungal infection; improvement noted with Penlac use.  - Advised to apply Penlac daily for several months, cleaning off every five days before reapplying.  - Prescription for Penlac provided; oral antifungal medication may be considered if condition persists.    3. Sciatica.  - Left-sided radiating pain exacerbated by physical activity.  - X-ray ordered to establish baseline; referral to physical therapy made.  - If x-ray results are unremarkable, pain likely muscular in origin.    PROCEDURE  Procedure: Pap smear    All questions were answered and agreement to proceed was given after the following Pre-Procedure details were reviewed:  - Risks and Benefits: Discussed the importance of routine Pap smears for cervical cancer screening and the potential discomfort during the procedure.  - Side effects: Mentioned potential mild discomfort and spotting post-procedure.  - Consent: Verbal consent obtained.    Intra-Procedure:  - Time-Out: Confirmed patient's identity and procedure.  - Site Preparation: Patient positioned appropriately, and speculum  inserted.    Post-Procedure:  - Tolerance Level: Patient tolerated the procedure well.  - Home Care Instructions: Advised patient to monitor for any unusual symptoms and to report if pain worsens or if there are signs of infection.      HEALTHCARE MAINTENANCE ISSUES     Cancer Screening:  Colon: Initial/Next screening at age: 43  Repeat colon cancer screening: N/A at this time  Breast: Recommended monthly self exams; annual professional exam  Mammogram: every 1 year  Cervical: 1 year  Skin: Monthly self skin examination, annual exam by health professional      Lifestyle Counseling:  Lifestyle Modifications: Improve dietary compliance, Increase intensity/regularity of aerobic exercise, Continue to abstain/curtail drinking, Continue to abstain from smoking, and Reduce exposure to stress if possible  Safety Issues: Always wear seatbelt, Avoid texting while driving   Use sunscreen, regular skin examination  Recommended annual dental/vision examination.  Emotional/Stress/Sleep: Reviewed and  given when appropriate    Patient or patient representative verbalized consent for the use of Ambient Listening during the visit with  CAT Sewell for chart documentation. 4/23/2025  09:28 EDT

## 2025-04-27 LAB
CYTOLOGIST CVX/VAG CYTO: NORMAL
CYTOLOGY CVX/VAG DOC CYTO: NORMAL
CYTOLOGY CVX/VAG DOC THIN PREP: NORMAL
DX ICD CODE: NORMAL
HPV I/H RISK 4 DNA CVX QL PROBE+SIG AMP: NEGATIVE
OTHER STN SPEC: NORMAL
SERVICE CMNT-IMP: NORMAL
STAT OF ADQ CVX/VAG CYTO-IMP: NORMAL

## 2025-07-03 ENCOUNTER — HOSPITAL ENCOUNTER (OUTPATIENT)
Dept: GENERAL RADIOLOGY | Facility: HOSPITAL | Age: 40
Discharge: HOME | End: 2025-07-03
Payer: OTHER GOVERNMENT

## 2025-07-03 DIAGNOSIS — M54.42 CHRONIC MIDLINE LOW BACK PAIN WITH LEFT-SIDED SCIATICA: ICD-10-CM

## 2025-07-03 DIAGNOSIS — G89.29 CHRONIC MIDLINE LOW BACK PAIN WITH LEFT-SIDED SCIATICA: ICD-10-CM

## 2025-07-03 PROCEDURE — 72110 X-RAY EXAM L-2 SPINE 4/>VWS: CPT

## 2025-07-18 ENCOUNTER — TREATMENT (OUTPATIENT)
Dept: PHYSICAL THERAPY | Facility: CLINIC | Age: 40
End: 2025-07-18
Payer: OTHER GOVERNMENT

## 2025-07-18 DIAGNOSIS — R26.9 GAIT DISTURBANCE: ICD-10-CM

## 2025-07-18 DIAGNOSIS — R29.898 WEAKNESS OF BOTH LOWER EXTREMITIES: ICD-10-CM

## 2025-07-18 DIAGNOSIS — M54.50 CHRONIC MIDLINE LOW BACK PAIN WITHOUT SCIATICA: Primary | ICD-10-CM

## 2025-07-18 DIAGNOSIS — G89.29 CHRONIC MIDLINE LOW BACK PAIN WITHOUT SCIATICA: Primary | ICD-10-CM

## 2025-07-18 DIAGNOSIS — M53.86 DECREASED RANGE OF MOTION OF LUMBAR SPINE: ICD-10-CM

## 2025-07-18 NOTE — PROGRESS NOTES
Physical Therapy Initial Evaluation and Plan of Care  [address] [facility] [address] [place] [address]    Patient: Renetta Beverly   : 1985  Diagnosis/ICD-10 Code:  Chronic midline low back pain without sciatica [M54.50, G89.29]  Referring practitioner: Jackie Mckeon, *  Date of Initial Visit: 2025  Today's Date: 2025  Patient seen for 1 sessions           Subjective Questionnaire: Oswestry:       Subjective Evaluation    History of Present Illness  Mechanism of injury: Pt presents to PT with reports of LBP that can radiate down B LE's, but reports it typically will radiate down L side the most. Pt reports the pain does not extend past  knees. Pt reports her pain started in 2018 after she had her son. Pt states she had PT and states it really helped with her pain. Pt reports in 2019 she was pregnant with her daughter and her back pain significantly increased. Pt reports both of her children were delivered by . Pt reports she decided to seek help due to have significant pain with lifting heavy objects. Pt reports there are times she requires assistance to get out of the grocery store due to pain. Pt reports her pain goes in waves of sharp pain and soreness. Pt did have x-ray of lumbar spine revealing:     Minimal lower lumbar facet arthropathy.      Patient Occupation: [employer]  Current pain ratin  At best pain ratin  At worst pain ratin  Quality: sharp  Relieving factors: heat  Aggravating factors: lifting    Patient Goals  Patient goals for therapy: decreased pain, increased strength and increased motion  Patient goal: Improve mobility           Objective          Palpation   Left   Tenderness of the erector spinae and quadratus lumborum.     Right Tenderness of the erector spinae and quadratus lumborum.     Tenderness     Left Hip   Tenderness in the greater trochanter.     Right Hip   Tenderness in the greater trochanter.     Additional Tenderness  Details  Increase in pain with PA's to L2/L3 and L5/S1.     Neurological Testing     Sensation     Lumbar   Left   Intact: light touch    Right   Intact: light touch    Active Range of Motion     Additional Active Range of Motion Details  Lumbar flexion: WFL  Lumbar Extension: patient rotates to the R with lumbar extension  SB to the R: WFL but patient has increase in R rotation  SB to the L: WFL  Rotation: WFL bilaterally       Hip ROM: WFL in all planes - B    Strength/Myotome Testing     Left Hip   Planes of Motion   Flexion: 4-  Extension: 4+  Abduction: 4-  Adduction: 4-    Right Hip   Planes of Motion   Flexion: 4  Extension: 4+  Abduction: 4  Adduction: 4    Left Knee   Flexion: 5  Extension: 5    Right Knee   Flexion: 5  Extension: 5    Left Ankle/Foot   Dorsiflexion: 5    Right Ankle/Foot   Dorsiflexion: 5    Muscle Activation     Additional Muscle Activation Details  Decrease in muscle activation of TA - B    Tests     Lumbar     Left   Positive quadrant.   Negative crossed SLR and passive SLR.     Right   Negative crossed SLR, passive SLR and quadrant.     Additional Tests Details  Positive scour test on L     Negative ORQUIDEA and FADIR    Lumbar Flexibility Comments:   Decrease in flexibility in B piriformis and B hamstring     Ambulation     Observational Gait     Additional Observational Gait Details  Pt ambulates with decrease stance time on L LE and decrease in L hip extension on stance phase.         See Exercise, Manual, and Modality Logs for complete treatment.       Assessment & Plan       Assessment  Impairments: abnormal gait, abnormal or restricted ROM, activity intolerance, impaired physical strength, lacks appropriate home exercise program, pain with function and weight-bearing intolerance   Functional limitations: lifting, walking, uncomfortable because of pain and standing   Assessment details: Pt presents to PT with lumbar pain that refers down B LE with L being greater than R. Pt has signs  and symptoms consistent with lumbar radiculopathy. Pt has decrease in lumbar ROM, decrease in strength, increase in lumbar pain, antalgic gait, decrease in flexibility, and decrease in functional mobility. Pt requires skilled PT in order to address deficits listed to return patient back to maximum function such as walking with normal gait mechanics. Initiated patient's HEP and patient tolerated well.   Prognosis: good    Goals  Plan Goals: LUMBAR PROBLEMS:    1. The patient has complaints of pain.    LTG 1: 12 weeks: The patient will report lower back pain no greater than 1/10 in order to improve tolerance with activities of daily living and improve sleep quality.    STATUS: New    STG 1a: 6 weeks: The patient will report lower back pain no greater than 3/10.     STATUS: New      2. The patient has limited lumbar spine ROM.    LTG 2: 12 weeks: The patient will improve lumbar spine ROM to 100% in order to improve tolerance with activities of daily living.    STATUS: New      STG 2a: 6 weeks: The patient will improve lumbar spine ROM to 75% in order to improve tolerance with activities of daily living.    STATUS: New    3. The patient demonstrates weakness of the bilateral hip.    LTG 3: 12 weeks: The patient will demonstrate 5/5 strength for bilateral hip flexion, abduction, and extension in order to improve hip stability.    STATUS: New      STG 3a: 6 weeks: The patient will demonstrate 4+/5 strength for bilateral hip flexion, abduction, and extension.    STATUS: New      Mobility: Walking/Moving Around Functional Limitation    LTG 4: 12 weeks: The patient will demonstrate 1-19% limitation by achieving a score of 2 on the Oswestry Disability Questionnaire.    STATUS: New    STG 4a: 6 weeks: The patient will demonstrate 1-19% limitation by achieving a score of 4 on the Oswestry Disability Quiestionnaire.    STATUS: New      Plan  Therapy options: will be seen for skilled therapy services  Planned modality  interventions: cryotherapy, TENS, dry needling, electrical stimulation/Russian stimulation, thermotherapy (hydrocollator packs) and traction  Planned therapy interventions: manual therapy, neuromuscular re-education, ADL retraining, balance/weight-bearing training, flexibility, functional ROM exercises, gait training, home exercise program, joint mobilization, soft tissue mobilization, strengthening, stretching, therapeutic activities, abdominal trunk stabilization, spinal/joint mobilization, postural training and IADL retraining  Frequency: 2x week  Duration in weeks: 12  Treatment plan discussed with: patient        History # of Personal Factors and/or Comorbidities: LOW (0)  Examination of Body System(s): # of elements: LOW (1-2)  Clinical Presentation: STABLE   Clinical Decision Making: LOW       Timed:         Manual Therapy:    0     mins  96483;     Therapeutic Exercise:    10     mins  53621;     Neuromuscular Ezequiel:    0    mins  36534;    Therapeutic Activity:     0     mins  33299;     Gait Trainin     mins  53781;     Ultrasound:     0     mins  68039;    Ionto                               0    mins   09648  Self pay                         0     mins [id number]    Un-Timed:  Electrical Stimulation:    0     mins  72175 ( )  Traction     0     mins 95762  Low Eval     40     Mins  77869  Mod Eval     0     Mins  34493  High Eval                       0     Mins  10710  Self Pay Eval                 0     PTSP1   Re-Eval                           0    mins  90809        Timed Treatment:   10   mins   Total Treatment:     50   mins    PT SIGNATURE: Electronically signed by Brandon Wilburn PT  [place] LICENSE: [id number]    DATE TREATMENT INITIATED: 2025    Initial Certification  Certification Period: 2025 thru 10/15/2025  I certify that the therapy services are furnished while this patient is under my care.  The services outlined above are required by this patient, and will be reviewed  every 90 days.     PHYSICIAN: Jackie Mckeon APRN   NPI: [id number]      DATE:     Please sign and return via fax to [phone] Thank you, [facility].

## 2025-07-23 ENCOUNTER — TREATMENT (OUTPATIENT)
Dept: PHYSICAL THERAPY | Facility: CLINIC | Age: 40
End: 2025-07-23
Payer: OTHER GOVERNMENT

## 2025-07-23 DIAGNOSIS — G89.29 CHRONIC MIDLINE LOW BACK PAIN WITHOUT SCIATICA: Primary | ICD-10-CM

## 2025-07-23 DIAGNOSIS — R29.898 WEAKNESS OF BOTH LOWER EXTREMITIES: ICD-10-CM

## 2025-07-23 DIAGNOSIS — M53.86 DECREASED RANGE OF MOTION OF LUMBAR SPINE: ICD-10-CM

## 2025-07-23 DIAGNOSIS — R26.9 GAIT DISTURBANCE: ICD-10-CM

## 2025-07-23 DIAGNOSIS — M54.50 CHRONIC MIDLINE LOW BACK PAIN WITHOUT SCIATICA: Primary | ICD-10-CM

## 2025-07-23 NOTE — PROGRESS NOTES
Physical Therapy Daily Treatment Note  [facility] [address], [address], [place] [address]  P: [phone]   [phone]    Patient: Renetta Beverly   : 1985  Referring practitioner: Jackie Mckeon, *  Date of Initial Visit: Type: THERAPY  Noted: 2025  Today's Date: 2025  Patient seen for 2 sessions      Visit Diagnoses:    ICD-10-CM ICD-9-CM   1. Chronic midline low back pain without sciatica  M54.50 724.2    G89.29 338.29   2. Weakness of both lower extremities  R29.898 729.89   3. Decreased range of motion of lumbar spine  M53.86 724.9   4. Gait disturbance  R26.9 781.2       Subjective   Renetta Beverly reports 3-4/10 pain in L hip. Pt reports she has been compliant with HEP. Pt reports her hip is bothering her more today than her back.       Objective     See Exercise, Manual, and Modality Logs for complete treatment.     Assessment/Plan    Progressed patient's strengthening and flexibility exercises and patient tolerated well with slight reports of increase in anterior L hip pain with some exercises. Focused manual therapy today on patient's L hip due to pain. Pt did have increase in L anterior hip pain with piriformis stretch and hip flexor stretch, but able to tolerate. Will continue to progress patient as able.       Progress per Plan of Care and Progress strengthening /stabilization /functional activity            Timed:                 Manual Therapy:    10     mins  82814;     Therapeutic Exercise:    15     mins  91441;     Neuromuscular Ezequiel:    15    mins  16794;    Therapeutic Activity:     0     mins  33408;     Gait Trainin     mins  67153;     Ultrasound:     0     mins  02769;    Ionto                               0    mins   19484  Self pay                         0     mins [id number]    Un-Timed:  Electrical Stimulation:    0     mins  01975 ( )  Canalith Repos    0     mins [id number]  Dry Needling      0     mins self-pay  Traction     0     mins [id number]    Timed Treatment:   40   mins   Total Treatment:     40   mins    Brandon Wilburn PT  Physical Therapist    PT SIGNATURE: Electronically signed by Brandon Wilburn PT  [place] LICENSE: [id number]

## 2025-08-05 ENCOUNTER — TREATMENT (OUTPATIENT)
Dept: PHYSICAL THERAPY | Facility: CLINIC | Age: 40
End: 2025-08-05
Payer: OTHER GOVERNMENT

## 2025-08-05 DIAGNOSIS — G89.29 CHRONIC MIDLINE LOW BACK PAIN WITHOUT SCIATICA: Primary | ICD-10-CM

## 2025-08-05 DIAGNOSIS — R26.9 GAIT DISTURBANCE: ICD-10-CM

## 2025-08-05 DIAGNOSIS — M53.86 DECREASED RANGE OF MOTION OF LUMBAR SPINE: ICD-10-CM

## 2025-08-05 DIAGNOSIS — R29.898 WEAKNESS OF BOTH LOWER EXTREMITIES: ICD-10-CM

## 2025-08-05 DIAGNOSIS — M54.50 CHRONIC MIDLINE LOW BACK PAIN WITHOUT SCIATICA: Primary | ICD-10-CM

## 2025-08-05 PROCEDURE — 97140 MANUAL THERAPY 1/> REGIONS: CPT | Performed by: PHYSICAL THERAPIST

## 2025-08-05 PROCEDURE — 97112 NEUROMUSCULAR REEDUCATION: CPT | Performed by: PHYSICAL THERAPIST

## 2025-08-05 PROCEDURE — 97110 THERAPEUTIC EXERCISES: CPT | Performed by: PHYSICAL THERAPIST

## 2025-08-12 ENCOUNTER — TREATMENT (OUTPATIENT)
Dept: PHYSICAL THERAPY | Facility: CLINIC | Age: 40
End: 2025-08-12
Payer: OTHER GOVERNMENT

## 2025-08-12 DIAGNOSIS — R26.9 GAIT DISTURBANCE: ICD-10-CM

## 2025-08-12 DIAGNOSIS — R29.898 WEAKNESS OF BOTH LOWER EXTREMITIES: ICD-10-CM

## 2025-08-12 DIAGNOSIS — G89.29 CHRONIC MIDLINE LOW BACK PAIN WITHOUT SCIATICA: Primary | ICD-10-CM

## 2025-08-12 DIAGNOSIS — M53.86 DECREASED RANGE OF MOTION OF LUMBAR SPINE: ICD-10-CM

## 2025-08-12 DIAGNOSIS — M54.50 CHRONIC MIDLINE LOW BACK PAIN WITHOUT SCIATICA: Primary | ICD-10-CM

## 2025-08-25 ENCOUNTER — TREATMENT (OUTPATIENT)
Dept: PHYSICAL THERAPY | Facility: CLINIC | Age: 40
End: 2025-08-25
Payer: OTHER GOVERNMENT

## 2025-08-25 DIAGNOSIS — M53.86 DECREASED RANGE OF MOTION OF LUMBAR SPINE: ICD-10-CM

## 2025-08-25 DIAGNOSIS — R29.898 WEAKNESS OF BOTH LOWER EXTREMITIES: ICD-10-CM

## 2025-08-25 DIAGNOSIS — M54.50 CHRONIC MIDLINE LOW BACK PAIN WITHOUT SCIATICA: Primary | ICD-10-CM

## 2025-08-25 DIAGNOSIS — G89.29 CHRONIC MIDLINE LOW BACK PAIN WITHOUT SCIATICA: Primary | ICD-10-CM

## 2025-08-25 DIAGNOSIS — R26.9 GAIT DISTURBANCE: ICD-10-CM
